# Patient Record
Sex: FEMALE
[De-identification: names, ages, dates, MRNs, and addresses within clinical notes are randomized per-mention and may not be internally consistent; named-entity substitution may affect disease eponyms.]

---

## 2024-07-26 ENCOUNTER — APPOINTMENT (OUTPATIENT)
Dept: INTERNAL MEDICINE | Facility: CLINIC | Age: 34
End: 2024-07-26

## 2024-07-26 PROBLEM — Z00.00 ENCOUNTER FOR PREVENTIVE HEALTH EXAMINATION: Status: ACTIVE | Noted: 2024-07-26

## 2024-07-29 PROBLEM — E66.09 OBESITY DUE TO EXCESS CALORIES: Status: ACTIVE | Noted: 2024-07-29

## 2024-07-29 PROBLEM — E66.9 OBESITY WITHOUT SERIOUS COMORBIDITY, UNSPECIFIED CLASSIFICATION, UNSPECIFIED OBESITY TYPE: Status: RESOLVED | Noted: 2024-07-29 | Resolved: 2024-07-29

## 2024-07-29 NOTE — REASON FOR VISIT
[Home] : at home, [unfilled] , at the time of the visit. [Medical Office: ([facility])___] : at the medical office located in  [Consultation] : a consultation visit

## 2024-07-29 NOTE — REASON FOR VISIT
[Home] : at home, [unfilled] , at the time of the visit. [Medical Office: ([facility])___] : at the medical office located in  [Consultation] : a consultation visit

## 2024-07-30 ENCOUNTER — APPOINTMENT (OUTPATIENT)
Dept: SURGERY | Facility: CLINIC | Age: 34
End: 2024-07-30
Payer: MEDICAID

## 2024-07-30 VITALS — BODY MASS INDEX: 35 KG/M2 | HEIGHT: 64 IN | WEIGHT: 205 LBS

## 2024-07-30 DIAGNOSIS — E66.9 OBESITY, UNSPECIFIED: ICD-10-CM

## 2024-07-30 DIAGNOSIS — Z98.84 BARIATRIC SURGERY STATUS: ICD-10-CM

## 2024-07-30 DIAGNOSIS — Z90.3 ACQUIRED ABSENCE OF STOMACH [PART OF]: ICD-10-CM

## 2024-07-30 DIAGNOSIS — E66.09 OTHER OBESITY DUE TO EXCESS CALORIES: ICD-10-CM

## 2024-07-30 PROCEDURE — 99204 OFFICE O/P NEW MOD 45 MIN: CPT | Mod: 95

## 2024-07-30 RX ORDER — CHOLESTYRAMINE 4 G/9G
4 POWDER, FOR SUSPENSION ORAL
Refills: 0 | Status: ACTIVE | COMMUNITY

## 2024-07-30 RX ORDER — NALTREXONE HYDROCHLORIDE AND BUPROPION HYDROCHLORIDE 8; 90 MG/1; MG/1
8-90 TABLET, EXTENDED RELEASE ORAL
Qty: 70 | Refills: 0 | Status: ACTIVE | COMMUNITY
Start: 2024-07-30 | End: 1900-01-01

## 2024-07-30 NOTE — ASSESSMENT
[FreeTextEntry1] : Sarah is a pleasant 34 year-old female who presents to discuss obesity medicine options today. Unfortunately, her insurance will not cover GLP-1 agonists in the absence of comorbid medical conditions.   Patient will have blood work from here PCP forwarded to my office for review.  In the interim, I will try to obtain authorization for Contrave. Side effects reviewed. In the interim patient will continue to increase his physical activity as tolerated and I recommend at least 150 minutes/week of both cardiovascular and resistance training methods.    Patient will contact me monthly to discuss medication tolerance and dose escalation and I will see her every 2 months for follow-up. Patient agrees with current plan.   We also discussed focusing on high quality lean proteins decrease carbohydrates and decrease fats. Patient verbalized understanding. I will also refer patient to our practice RD, Brian Sorenson, for phani/macro nutritional support.   Pt. verbalize understanding and wishes to proceed with medical therapy. We also discussed sleeve revision at some point. She does NOT desire conversion to bypass.   2 weeks pouch reset advised.

## 2024-07-30 NOTE — HISTORY OF PRESENT ILLNESS
[Improved Health] : Improved health [Quality of Life] : Quality of life [Improve Life Expectancy] : Improve life expectancy [] : Yes [VSG] : VSG [FreeTextEntry1] : Sarah is a 34 year old female who presents for initial obesity medicine consultation. Patient stands 5 feet 4 inches tall and weighs 205 pounds with a current BMI of 35. Reports no significant PMHx. She is s/p VSG in 2018 at [facility]. Pre-op weight was 250. Lowest weight was 170 lbs. She reports weight gain since her cholecystectomy in 2019. Increased hunger and diarrhea now on cholestyramine per GI. She feels her stomach has stretched. Her obesity is refractory to diet and exercise efforts. She denies GERD or reflux.   Patient is interested in exploring medical weight loss options and has already met with a clinical Pharmacist.

## 2024-07-30 NOTE — HISTORY OF PRESENT ILLNESS
[Improved Health] : Improved health [Quality of Life] : Quality of life [Improve Life Expectancy] : Improve life expectancy [] : Yes [VSG] : VSG [FreeTextEntry1] : Sarah is a 34 year old female who presents for initial obesity medicine consultation. Patient stands 5 feet 4 inches tall and weighs 205 pounds with a current BMI of 35. Reports no significant PMHx. She is s/p VSG in 2018 at [facility]. Pre-op weight was 250. Lowest weight was 170 lbs. She reports weight gain since her cholecystectomy in 2019. Increased hunger and diarrhea now on cholestyramine per GI. She feels her stomach has stretched. Her obesity is refractory to diet and exercise efforts. She denies GERD or reflux.   Patient is interested in exploring medical weight loss options and has already met with a clinical Pharmacist.

## 2024-08-05 RX ORDER — PHENTERMINE HYDROCHLORIDE 37.5 MG/1
37.5 TABLET ORAL
Qty: 30 | Refills: 3 | Status: ACTIVE | COMMUNITY
Start: 2024-07-31 | End: 1900-01-01

## 2024-10-28 ENCOUNTER — APPOINTMENT (OUTPATIENT)
Age: 34
End: 2024-10-28

## 2025-01-20 DIAGNOSIS — Z01.419 ENCOUNTER FOR GYNECOLOGICAL EXAMINATION (GENERAL) (ROUTINE) W/OUT ABNORMAL FINDINGS: ICD-10-CM

## 2025-01-21 ENCOUNTER — APPOINTMENT (OUTPATIENT)
Dept: OBGYN | Facility: CLINIC | Age: 35
End: 2025-01-21

## 2025-08-05 ENCOUNTER — APPOINTMENT (OUTPATIENT)
Dept: SURGERY | Facility: CLINIC | Age: 35
End: 2025-08-05
Payer: MEDICAID

## 2025-08-05 VITALS — HEIGHT: 64 IN | WEIGHT: 208 LBS | BODY MASS INDEX: 35.51 KG/M2

## 2025-08-05 DIAGNOSIS — Z98.84 BARIATRIC SURGERY STATUS: ICD-10-CM

## 2025-08-05 PROCEDURE — 99213 OFFICE O/P EST LOW 20 MIN: CPT | Mod: 95

## 2025-08-07 RX ORDER — PHENTERMINE HYDROCHLORIDE 37.5 MG/1
37.5 TABLET ORAL
Qty: 30 | Refills: 3 | Status: ACTIVE | COMMUNITY
Start: 2025-08-05 | End: 1900-01-01